# Patient Record
(demographics unavailable — no encounter records)

---

## 2025-04-01 NOTE — REASON FOR VISIT
[Symptom and Test Evaluation] : symptom and test evaluation [Hypertension] : hypertension [Other: ____] : [unfilled] [Spouse] : spouse

## 2025-04-01 NOTE — REVIEW OF SYSTEMS
[Weight Gain (___ Lbs)] : [unfilled] ~Ulb weight gain [SOB] : shortness of breath [Dyspnea on exertion] : dyspnea during exertion [Lower Ext Edema] : lower extremity edema [Anxiety] : anxiety [Negative] : Heme/Lymph

## 2025-04-01 NOTE — PHYSICAL EXAM
[Well Developed] : well developed [Well Nourished] : well nourished [No Acute Distress] : no acute distress [Obese] : obese [Normal Conjunctiva] : normal conjunctiva [Normal Venous Pressure] : normal venous pressure [No Carotid Bruit] : no carotid bruit [Normal Rate] : normal [Rhythm Regular] : regular [Normal S1] : normal S1 [Normal S2] : normal S2 [No Respiratory Distress] : no respiratory distress  [Jaswinder ___] : jaswinder LozoyaV [Dullness ____] : dullness [unfilled] [Soft] : abdomen soft [Non Tender] : non-tender [No Masses/organomegaly] : no masses/organomegaly [Normal Bowel Sounds] : normal bowel sounds [Normal Gait] : normal gait [No Cyanosis] : no cyanosis [No Clubbing] : no clubbing [No Varicosities] : no varicosities [Edema ___] : edema [unfilled] [No Rash] : no rash [Moves all extremities] : moves all extremities [No Focal Deficits] : no focal deficits [Normal Speech] : normal speech [Alert and Oriented] : alert and oriented [Normal memory] : normal memory

## 2025-04-02 NOTE — DISCUSSION/SUMMARY
[EKG obtained to assist in diagnosis and management of assessed problem(s)] : EKG obtained to assist in diagnosis and management of assessed problem(s) [FreeTextEntry1] : 56 year old Man with a history of morbid obesity (BMI 39kg/m2), prior diagnosis of diabetes (not on meds for multiple years) presents to the office for cardiovascular evaluation and hospital follow-up.    He is in no acute distress. He is clinically fluid overloaded with LE edema, scrotal edema and mid-section anasarca.  ECG illustrates sinus tachycardia with low voltage QRS.  Blood pressure is elevated at 163/97 with a manual repeat of 146/88.    Will start Carvedilol 6.25mg PO BID for heart rate and blood pressure control.  Will order Furosemide 40mg daily for a net negative daily fluid load and alleviate edema in the lower portion of his body.    He will undergo a 2D echo to assess for any new structural heart disease, changes in valvular and ventricular function to attribute his fluid accumulations.    He will also need to follow up with a PCP, Endocrinologist for better management of his diabetes and GI/Liver Specialist for his liver congestion/cirrhosis.  Diet and exercise counseling was performed in order to reduce future cardiovascular risk.  He will follow up in one month or sooner as needed.

## 2025-04-02 NOTE — HISTORY OF PRESENT ILLNESS
[FreeTextEntry1] : This is a 56 year old Man with a history of morbid obesity (BMI 39kg/m2), prior diagnosis of diabetes (not on meds for multiple years) presents to the office for cardiovascular evaluation and hospital follow-up.    In December 2024 he started to notice some swelling in his feet and noticed that he had trouble walking uphill but did not think much of it. Swelling would go down with feet elevation.  Has not seen a doctor in many years and has not seen anyone for current symptoms of swelling in his LE and mid-section.  In the past 3 to 4 weeks swelling has been progressing up his legs and into his groin and his abdomen. He went to urgent care on Friday 3/29/24 and was referred to the emergency department for further evaluation.   He presents to [facility] ED accompanied by his wife due to diffuse swelling.  Blood work and imaging was performed (CXR, Chest CT w/IV contrast and abd CT).  LLL atelectasis with bilateral pleural effusions Lt > Rt.  Enlarged heart, no pulmonary defect seen, fibrosis/cirrhosis of liver.  Cholelithiasis of the gallbladder and diffuse edema in all soft tissue and subcutaneous tissues.  Notable anasarca. He was later discharged home and told to follow up with a cardiologist.   I personally reviewed all the hospital records available to me at this time, which included but are not limited to the discharge summary, labs and imaging reports.  He presents to the office today with his wife Kimi.  He complains of feeling full, swollen feet, legs, scrotal edema and mid-section. He also complains of SOB and MERA with inclines.  He denies any chest pain, chest pressure, PND, orthopnea, near syncope, syncope, or stroke-like symptoms. He follows a keto and low sugar diet.  He does not exercise but stays active working two jobs. He does not take any medications.

## 2025-04-02 NOTE — HISTORY OF PRESENT ILLNESS
[FreeTextEntry1] : This is a 56 year old Man with a history of morbid obesity (BMI 39kg/m2), prior diagnosis of diabetes (not on meds for multiple years) presents to the office for cardiovascular evaluation and hospital follow-up.    In December 2024 he started to notice some swelling in his feet and noticed that he had trouble walking uphill but did not think much of it. Swelling would go down with feet elevation.  Has not seen a doctor in many years and has not seen anyone for current symptoms of swelling in his LE and mid-section.  In the past 3 to 4 weeks swelling has been progressing up his legs and into his groin and his abdomen. He went to urgent care on Friday 3/29/24 and was referred to the emergency department for further evaluation.   He presents to [facility] ED accompanied by his wife due to diffuse swelling.  Blood work and imaging was performed (CXR, Chest CT w/IV contrast and abd CT).  LLL atelectasis with bilateral pleural effusions Lt > Rt.  Enlarged heart, no pulmonary defect seen, fibrosis/cirrhosis of liver.  Cholelithiasis of the gallbladder and diffuse edema in all soft tissue and subcutaneous tissues.  Notable anasarca. He was later discharged home and told to follow up with a cardiologist.   I personally reviewed all the hospital records available to me at this time, which included but are not limited to the discharge summary, labs and imaging reports.  He presents to the office today with his wife Kimi.  He complains of feeling full, swollen feet, legs, scrotal edema and mid-section. He also complains of SOB and MERA with inclines.  He denies any chest pain, chest pressure, PND, orthopnea, near syncope, syncope, or stroke-like symptoms. He follows a keto and low sugar diet.  He does not exercise but stays active working two jobs. He does not take any medications.

## 2025-04-23 NOTE — HISTORY OF PRESENT ILLNESS
[FreeTextEntry1] : This is a 56 year old Man with a history of morbid obesity (BMI 39kg/m2), prior diagnosis of diabetes (not on meds for multiple years) presents to the office for cardiovascular evaluation.    In December 2024 he started to notice some swelling in his feet and noticed that he had trouble walking uphill but did not think much of it. Swelling would go down with feet elevation.  Has not seen a doctor in many years and has not seen anyone for current symptoms of swelling in his LE and mid-section.  In the past 3 to 4 weeks swelling has been progressing up his legs and into his groin and his abdomen. He went to urgent care on Friday 3/29/24 and was referred to the emergency department for further evaluation.   He presented to [facility] ED accompanied by his wife due to diffuse swelling.  Blood work and imaging was performed (CXR, Chest CT w/IV contrast and abd CT).  LLL atelectasis with bilateral pleural effusions Lt > Rt.  Enlarged heart, no pulmonary defect seen, fibrosis/cirrhosis of liver.    Notable anasarca. He was later discharged home and told to follow up with a cardiologist as he did not have insurance and did not want to get stuck with a hospital bill.      I saw him in follow up, and he was clearly volume overloaded.  He was started on carvedilol and Lasix.  He was sent for an echocardiogram, and is noted to have moderate to severe LV dysfunction, RVE, RV dysfunction, and moderate pulmonary hypertension.  He has only lost 2 pounds since his last visit.    He presents to the office today with his wife Kimi.  He complains of feeling full, swollen feet, legs, scrotal edema and mid-section. He also complains of SOB and MERA with inclines.  He denies any chest pain, chest pressure, PND, orthopnea, near syncope, syncope, or stroke-like symptoms.

## 2025-04-23 NOTE — DISCUSSION/SUMMARY
[FreeTextEntry1] : 56 year old Man with a history of morbid obesity (BMI 39kg/m2), prior diagnosis of diabetes (not on meds for multiple years) presents to the office for cardiovascular evaluation and hospital follow-up.    He presented to [facility] ED accompanied by his wife due to diffuse swelling.  Blood work and imaging was performed (CXR, Chest CT w/IV contrast and abd CT).  LLL atelectasis with bilateral pleural effusions Lt > Rt.  Enlarged heart, no pulmonary defect seen, fibrosis/cirrhosis of liver.    Notable anasarca. He was later discharged home and told to follow up with a cardiologist as he did not have insurance and did not want to get stuck with a hospital bill.      I saw him in follow up, and he was clearly volume overloaded.  He was started on carvedilol and Lasix.  He was sent for an echocardiogram, and is noted to have moderate to severe LV dysfunction, RVE, RV dysfunction, and moderate pulmonary hypertension.  He has only lost 2 pounds since his last visit.   He is not responding to diuresis.  I advised them to go to the ED at  now for a heart failure evaluation, right and left heart cath, hepatology evaluation, and possible thoracentesis.  They can not go as they have business to tend to in [place].  They will go Monday AM.  He is going to increase Lasix to 60 QD, and start Entresto 24-26 bid.  He will continue carvedilol 6.25 bid.  They understand the risks of waiting, including death.  [EKG obtained to assist in diagnosis and management of assessed problem(s)] : EKG obtained to assist in diagnosis and management of assessed problem(s)

## 2025-05-13 NOTE — REASON FOR VISIT
[Hypertension] : hypertension [Other: ____] : [unfilled] [Cardiac Failure] : cardiac failure [Hyperlipidemia] : hyperlipidemia [Coronary Artery Disease] : coronary artery disease

## 2025-05-13 NOTE — PHYSICAL EXAM
[Well Developed] : well developed [Well Nourished] : well nourished [No Acute Distress] : no acute distress [Obese] : obese [Normal Conjunctiva] : normal conjunctiva [Normal Venous Pressure] : normal venous pressure [No Carotid Bruit] : no carotid bruit [Normal Rate] : normal [Rhythm Regular] : regular [Normal S1] : normal S1 [Normal S2] : normal S2 [No Respiratory Distress] : no respiratory distress  [Jaswinder ___] : jaswinder LozoyaV [Dullness ____] : dullness [unfilled] [Soft] : abdomen soft [Non Tender] : non-tender [No Masses/organomegaly] : no masses/organomegaly [Normal Bowel Sounds] : normal bowel sounds [Normal Gait] : normal gait [No Cyanosis] : no cyanosis [No Clubbing] : no clubbing [No Varicosities] : no varicosities [Edema ___] : edema [unfilled] [No Rash] : no rash [Moves all extremities] : moves all extremities [No Focal Deficits] : no focal deficits [Normal Speech] : normal speech [Alert and Oriented] : alert and oriented [Normal memory] : normal memory [No Murmur] : no murmurs heard [No Pitting Edema] : no pitting edema present

## 2025-05-13 NOTE — HISTORY OF PRESENT ILLNESS
[FreeTextEntry1] : This is a 56 year old Man with a history of morbid obesity (BMI 39kg/m2), prior diagnosis of diabetes (not on meds for multiple years) presents to the office for cardiovascular evaluation.    In December 2024 he started to notice some swelling in his feet and noticed that he had trouble walking uphill but did not think much of it. Swelling would go down with feet elevation.  Has not seen a doctor in many years and has not seen anyone for current symptoms of swelling in his LE and mid-section.  In the past 3 to 4 weeks swelling has been progressing up his legs and into his groin and his abdomen. He went to urgent care on Friday 3/29/24 and was referred to the emergency department for further evaluation.   He presented to [facility] ED accompanied by his wife due to diffuse swelling.  Blood work and imaging was performed (CXR, Chest CT w/IV contrast and abd CT).  LLL atelectasis with bilateral pleural effusions Lt > Rt.  Enlarged heart, no pulmonary defect seen, fibrosis/cirrhosis of liver.    Notable anasarca. He was later discharged home and told to follow up with a cardiologist as he did not have insurance and did not want to get stuck with a hospital bill.  I saw him in follow up, and he was clearly volume overloaded.  He was started on carvedilol and Lasix.  He was sent for an echocardiogram, and is noted to have moderate to severe LV dysfunction, RVE, RV dysfunction, and moderate pulmonary hypertension.  He has only lost 2 pounds since his last visit.     Arrives now again s/p hospitalization with ADHF,new HFrEF in the setting of ICM admitted on 4/29/25. He initally presented to the ED on 4/28 with overload symptoms as was advised but left prior to being seen due to wait time in the ED.  He then returns the next day to the ED, Hospitalization workup included  LHC on 4/29, LVEDP 25 showing mvCAD, poor target LAD-80%pLAD and LCX 50% as well as a RCA .  4/30/25: LVEF 31%, global LV hypokinesis, Grade III diastolic dysfunction RVE with reduced RV function, mild PH cMRI 5/1 shows viability. Normal left ventricular size. Normal left ventricular wall thickness. Severely reduced left ventricular systolic function (LVEF 31%). Global hypokinesis. Septal flattening in systole and diastole suggestive of right ventricular pressure and volume overload. Subendocardial LGE of the anterior wall, septum, inferolateral wall, and apical cap as detailed above; there is viability in these territories.  Normal right ventricular   Hepatology was consulted based on CTAP showed moderate volume ascites in all 4 quadrants, and heterogeneous perfusion of the liver with irregular surface suggesting fibrosis/cirrhosis, possibly based on chronic congestion and right heart failure. Outpatient follow up advised.  (liver biopsy to determine whether he has cirrhosis or liver congestion). CT surgery consultation obtained, surgery deemed high risk at this time noting severe ischemic cardiomyopathy, poor coronary targets, abdominal ascites and a nodular liver consistent with cirrhosis. Optimized medical therapy and outpatient HF follow up.  He arrives today for follow up , feeling "much better". Reports no longer experiencing SOB on inclines. He denies chest pains or pressure. He  denies dizzy spells, feeling faint or fainting, He   denies palpitations or difficulty breathing while laying flat. He denies lower extremity swelling. All edema has resolved.  He has been diuresing well, overall down 100 pounds of volume weight and is started on GDMTs tolerating well.  Medication reconciliation performed, he is taking all advised medications.

## 2025-05-13 NOTE — CARDIOLOGY SUMMARY
[de-identified] : NSR.  Low voltage.  Nonspecific T wave changes.  [de-identified] : 4/30/25: LVEF 31%, global LV hypokinesis, Grade III diastolic dysfunction RVE with reduced RV function, mild PH 4/18/25: LVEF 34%, global hypokinesis, RVE-reduced RV function, mild MR, mild-mod TR, PASP 54 [de-identified] : 5/1/25: cardiac MR 1. Normal left ventricular size. Normal left ventricular wall thickness. Severely reduced left ventricular systolic function (LVEF 31%). Global hypokinesis. Septal flattening in systole and diastole suggestive of right ventricular pressure and volume overload. 2. Subendocardial LGE of the anterior wall, septum, inferolateral wall, and apical cap as detailed above; there is viability in these territories. 3. Normal right ventricular size by indexed volume (visually appears   [de-identified] : 4/29/25: Diagnostic Findings:  Coronary Angiography  The coronary circulation is right dominant.    LM  Left main artery: Angiography shows minor irregularities.   LAD  Proximal left anterior descending: diffuse distal disease. There is an 80 % stenosis. CX  Circumflex: There is a 50 % stenosis.   RCA  Right coronary artery: There is a 100 % stenosis.     [de-identified] : 4/30/25  no carotid stenosis

## 2025-05-13 NOTE — REVIEW OF SYSTEMS
[Weight Gain (___ Lbs)] : [unfilled] ~Ulb weight gain [Anxiety] : anxiety [Negative] : Heme/Lymph [SOB] : no shortness of breath [Dyspnea on exertion] : not dyspnea during exertion [Chest Discomfort] : no chest discomfort [Lower Ext Edema] : no extremity edema [Leg Claudication] : no intermittent leg claudication [Palpitations] : no palpitations [Orthopnea] : no orthopnea [PND] : no PND [Syncope] : no syncope [FreeTextEntry5] : all prior symptoms resolved

## 2025-05-13 NOTE — DISCUSSION/SUMMARY
[EKG obtained to assist in diagnosis and management of assessed problem(s)] : EKG obtained to assist in diagnosis and management of assessed problem(s) [FreeTextEntry1] : 56 year old Man with a history of morbid obesity (BMI 39kg/m2), prior diagnosis of diabetes (not on meds for multiple years) presents to the office for cardiovascular evaluation and hospital follow-up.    He presented to [facility] ED accompanied by his wife due to diffuse swelling.  Blood work and imaging was performed (CXR, Chest CT w/IV contrast and abd CT).  LLL atelectasis with bilateral pleural effusions Lt > Rt.  Enlarged heart, no pulmonary defect seen, fibrosis/cirrhosis of liver.    Notable anasarca. He was later discharged home and told to follow up with a cardiologist as he did not have insurance and did not want to get stuck with a hospital bill.      I saw him in follow up, and he was clearly volume overloaded.  He was started on carvedilol and Lasix.  He was sent for an echocardiogram, and is noted to have moderate to severe LV dysfunction, RVE, RV dysfunction, and moderate pulmonary hypertension.    Arrives now again s/p hospitalization with ADHF,new HFrEF in the setting of ICM admitted on 4/29/25. He initally presented to the ED on 4/28 with overload symptoms as was advised but left prior to being seen due to wait time in the ED.  He then returns the next day to the ED, Hospitalization workup included  LHC on 4/29, LVEDP 25 showing mvCAD, poor target LAD-80%pLAD and LCX 50% as well as a RCA . Echocardiogram again demonstrating LVEF 31%, global LV hypokinesis, Grade III diastolic dysfunction RVE with reduced RV function, mild PH. cMRI 5/1 shows viability with LVEF 31%. Hepatology was consulted due to findings of CT CAP suggesting fibrosis/cirrhosis, possibly based on chronic congestion and right heart failure.   He arrives today , compensated from a HF and volume perspective. His weight is down 100 lbs. He is euvolemic on exam. ECG illustrates sinus rhythm, low voltage and his B/P normalized by the conclusion of the visit. Mr Horton was diagnosed with severe ICM, HFrEF, NYHA class II. He is now on GDMT and tolerating.  He will continue volume management with bumex 1mg daily, farxiga 10mg  He will continue entresto 97/103 BID, eplerenone 25mg.  He will increase carvedilol to 12.5mg BID to optimize HR and B/P. F/U with heart failure team as scheduled.  Coronary angiogram demonstrates mvCAD,  RCA .He has follow up with CT surgery in 5/23. If Targets deemed poor, will look into candidacy for revascularization via PCI. He will continue atorvastatin 80mg daily for goal LDL <70,ideally closer to 55 and ASA for 2nd prevention.  There were image findings c/w possible cirrhosis, he will follow up with hepatology for further workup as scheduled on 5/29.   A1C was 8.3, He knows the importance of diabetes management. He is scheduling follow up with internal medicine within the next few weeks.  HE will continue farxiga for now.  He will have B/W done. He will follow up in [place], sooner as needed.

## 2025-05-21 NOTE — HISTORY OF PRESENT ILLNESS
[FreeTextEntry1] : Patient is a 57yo man followed and referred by Dr. Collins for evaluation.  PMH includes morbid obesity (BMI 39kg/m2), prior diagnosis of diabetes (not on meds for multiple years).   In December 2024 he started to notice some swelling in his feet and noticed that he had trouble walking uphill but did not think much of it. Swelling would go down with feet elevation. Has not seen a doctor in many years and has not seen anyone for current symptoms of swelling in his LE and mid-section. In the past 3 to 4 weeks swelling has been progressing up his legs and into his groin and his abdomen. He went to urgent care on Friday 3/29/24 and was referred to the emergency department for further evaluation.  He presented to [facility] ED accompanied by his wife due to diffuse swelling. Blood work and imaging was performed (CXR, Chest CT w/IV contrast and abd CT). LLL atelectasis with bilateral pleural effusions Lt > Rt. Enlarged heart, no pulmonary defect seen, fibrosis/cirrhosis of liver. Notable anasarca. He was later discharged home and told to follow up with a cardiologist as he did not have insurance and did not want to get stuck with a hospital bill.   He was readmitted 4/29-3/8 with ADHF, new HFrEF in the setting of ICM. Hospitalization workup included LHC on 4/29, LVEDP 25 showing mvCAD, poor target LAD-80%pLAD and LCX 50% as well as a RCA . Underwent TTE 4/30/25: LVEF 31%, global LV hypokinesis, Grade III diastolic dysfunction RVE with reduced RV function, mild PH. Underwent cMRI 5/1 shows viability. Normal left ventricular size. Normal left ventricular wall thickness. Severely reduced left ventricular systolic function (LVEF 31%). Global hypokinesis. Septal flattening in systole and diastole suggestive of right ventricular pressure and volume overload. Subendocardial LGE of the anterior wall, septum, inferolateral wall, and apical cap as detailed above; there is viability in these territories. Normal right ventricular  Hepatology was consulted based on CTAP showed moderate volume ascites in all 4 quadrants, and heterogeneous perfusion of the liver with irregular surface suggesting fibrosis/cirrhosis, possibly based on chronic congestion and right heart failure. Outpatient follow up advised. (liver biopsy to determine whether he has cirrhosis or liver congestion). CT surgery consultation obtained, surgery deemed high risk at this time noting severe ischemic cardiomyopathy, poor coronary targets, abdominal ascites and a nodular liver consistent with cirrhosis. Optimized medical therapy and outpatient HF follow up.  Saw cardiologist last week, diuresing well, overall down 100 pounds of volume weight and is started on GDMTs tolerating well.  Presents today.  Seeing HF Friday.  Lost 100 lbs of edema.  Feels much better since.  Denies CP, SOB. dizziness. 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

## 2025-05-21 NOTE — END OF VISIT
[FreeTextEntry3] : Written by Aquilino Resendiz NP, acting as a scribe for Dr. Munoz The documentation recorded by the scribe accurately reflects the service I personally performed and the decisions made by me. Signature Ranjeet Munoz MD.

## 2025-05-21 NOTE — HISTORY OF PRESENT ILLNESS
[FreeTextEntry1] : Patient is a 55yo man followed and referred by Dr. Collins for evaluation.  PMH includes morbid obesity (BMI 39kg/m2), prior diagnosis of diabetes (not on meds for multiple years).   In December 2024 he started to notice some swelling in his feet and noticed that he had trouble walking uphill but did not think much of it. Swelling would go down with feet elevation. Has not seen a doctor in many years and has not seen anyone for current symptoms of swelling in his LE and mid-section. In the past 3 to 4 weeks swelling has been progressing up his legs and into his groin and his abdomen. He went to urgent care on Friday 3/29/24 and was referred to the emergency department for further evaluation.  He presented to [facility] ED accompanied by his wife due to diffuse swelling. Blood work and imaging was performed (CXR, Chest CT w/IV contrast and abd CT). LLL atelectasis with bilateral pleural effusions Lt > Rt. Enlarged heart, no pulmonary defect seen, fibrosis/cirrhosis of liver. Notable anasarca. He was later discharged home and told to follow up with a cardiologist as he did not have insurance and did not want to get stuck with a hospital bill.   He was readmitted 4/29-3/8 with ADHF, new HFrEF in the setting of ICM. Hospitalization workup included LHC on 4/29, LVEDP 25 showing mvCAD, poor target LAD-80%pLAD and LCX 50% as well as a RCA . Underwent TTE 4/30/25: LVEF 31%, global LV hypokinesis, Grade III diastolic dysfunction RVE with reduced RV function, mild PH. Underwent cMRI 5/1 shows viability. Normal left ventricular size. Normal left ventricular wall thickness. Severely reduced left ventricular systolic function (LVEF 31%). Global hypokinesis. Septal flattening in systole and diastole suggestive of right ventricular pressure and volume overload. Subendocardial LGE of the anterior wall, septum, inferolateral wall, and apical cap as detailed above; there is viability in these territories. Normal right ventricular  Hepatology was consulted based on CTAP showed moderate volume ascites in all 4 quadrants, and heterogeneous perfusion of the liver with irregular surface suggesting fibrosis/cirrhosis, possibly based on chronic congestion and right heart failure. Outpatient follow up advised. (liver biopsy to determine whether he has cirrhosis or liver congestion). CT surgery consultation obtained, surgery deemed high risk at this time noting severe ischemic cardiomyopathy, poor coronary targets, abdominal ascites and a nodular liver consistent with cirrhosis. Optimized medical therapy and outpatient HF follow up.  Saw cardiologist last week, diuresing well, overall down 100 pounds of volume weight and is started on GDMTs tolerating well.  Presents today.  Seeing HF Friday.  Lost 100 lbs of edema.  Feels much better since.  Denies CP, SOB. dizziness. 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

## 2025-05-21 NOTE — PHYSICAL EXAM
[General Appearance - Alert] : alert [General Appearance - In No Acute Distress] : in no acute distress [Sclera] : the sclera and conjunctiva were normal [Neck Cervical Mass (___cm)] : no neck mass was observed [] : no respiratory distress [Respiration, Rhythm And Depth] : normal respiratory rhythm and effort [Exaggerated Use Of Accessory Muscles For Inspiration] : no accessory muscle use [Auscultation Breath Sounds / Voice Sounds] : lungs were clear to auscultation bilaterally [Apical Impulse] : the apical impulse was normal [Heart Rate And Rhythm] : heart rate was normal and rhythm regular [Heart Sounds] : normal S1 and S2 [Abdomen Soft] : soft [Abdomen Tenderness] : non-tender [Involuntary Movements] : no involuntary movements were seen [No Focal Deficits] : no focal deficits [Oriented To Time, Place, And Person] : oriented to person, place, and time [Impaired Insight] : insight and judgment were intact [Affect] : the affect was normal [Mood] : the mood was normal

## 2025-05-21 NOTE — ASSESSMENT
[FreeTextEntry1] : I reviewed the cardiac imaging, medical records and reports with patient and discussed the case.  Recommend medical MGMT, Will refer for PCI. Follow up with HF for GDMT

## 2025-05-21 NOTE — HISTORY OF PRESENT ILLNESS
[FreeTextEntry1] : Patient is a 57yo man followed and referred by Dr. Collins for evaluation.  PMH includes morbid obesity (BMI 39kg/m2), prior diagnosis of diabetes (not on meds for multiple years).   In December 2024 he started to notice some swelling in his feet and noticed that he had trouble walking uphill but did not think much of it. Swelling would go down with feet elevation. Has not seen a doctor in many years and has not seen anyone for current symptoms of swelling in his LE and mid-section. In the past 3 to 4 weeks swelling has been progressing up his legs and into his groin and his abdomen. He went to urgent care on Friday 3/29/24 and was referred to the emergency department for further evaluation.  He presented to [facility] ED accompanied by his wife due to diffuse swelling. Blood work and imaging was performed (CXR, Chest CT w/IV contrast and abd CT). LLL atelectasis with bilateral pleural effusions Lt > Rt. Enlarged heart, no pulmonary defect seen, fibrosis/cirrhosis of liver. Notable anasarca. He was later discharged home and told to follow up with a cardiologist as he did not have insurance and did not want to get stuck with a hospital bill.   He was readmitted 4/29-3/8 with ADHF, new HFrEF in the setting of ICM. Hospitalization workup included LHC on 4/29, LVEDP 25 showing mvCAD, poor target LAD-80%pLAD and LCX 50% as well as a RCA . Underwent TTE 4/30/25: LVEF 31%, global LV hypokinesis, Grade III diastolic dysfunction RVE with reduced RV function, mild PH. Underwent cMRI 5/1 shows viability. Normal left ventricular size. Normal left ventricular wall thickness. Severely reduced left ventricular systolic function (LVEF 31%). Global hypokinesis. Septal flattening in systole and diastole suggestive of right ventricular pressure and volume overload. Subendocardial LGE of the anterior wall, septum, inferolateral wall, and apical cap as detailed above; there is viability in these territories. Normal right ventricular  Hepatology was consulted based on CTAP showed moderate volume ascites in all 4 quadrants, and heterogeneous perfusion of the liver with irregular surface suggesting fibrosis/cirrhosis, possibly based on chronic congestion and right heart failure. Outpatient follow up advised. (liver biopsy to determine whether he has cirrhosis or liver congestion). CT surgery consultation obtained, surgery deemed high risk at this time noting severe ischemic cardiomyopathy, poor coronary targets, abdominal ascites and a nodular liver consistent with cirrhosis. Optimized medical therapy and outpatient HF follow up.  Saw cardiologist last week, diuresing well, overall down 100 pounds of volume weight and is started on GDMTs tolerating well.  Presents today.  Seeing HF Friday.  Lost 100 lbs of edema.  Feels much better since.  Denies CP, SOB. dizziness. 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

## 2025-05-23 NOTE — PHYSICAL EXAM
[No Edema] : no edema [Normal Radial B/L] : normal radial B/L [Normal] : moves all extremities, no focal deficits, normal speech [Alert and Oriented] : alert and oriented [de-identified] : MMM. No perioral cyanosis. [de-identified] : JVP 6-8 cm H2O [de-identified] : warm peripherally

## 2025-05-23 NOTE — HISTORY OF PRESENT ILLNESS
[FreeTextEntry1] : Mr. Horton is a 56M with newly diagnosed ICM HFrEF (LVEF 31%, LVIDd 5.2 cm, 2025), PMHx T2DM (A1c 8.3%, 2025), & HTN presents today for HFU.  He presented to [place] ED in 3/2025 with BLE edema that progressed into his groin & abdomen & worsening activity tolerance. Per ED note, he had been experiencing these s/s since 2024 but had not been evaluated. Of note, he has not seen any doctor for "many years." CT chest/AP revealing B/L pleural effusions & notable anasarca. He was not amenable to admission at that time for financial concerns d/t lack of insurance, so he was dc home & established care with general cardiology clinic. Some GDMT initiated as an outpatient: Entresto 24-26 mg BID & carvedilol 6.25 mg BID.  He was recently hospitalized from 2025-2025 for anasarca from BLEs to abdomen & new ICM HFrEF. ED labs: delta troponin negative, pro-BNP 3,4237, Na 133, t-bii 2.1, . He underwent LHC which revealed multivessel CAD with poor LAD target with cMRI with subendocardial LGE of anterior wall, septum, inferolateral wall, & apical cap demonstrating viability for revascularization & LVEF 31%. Dr. Munoz (CTS) evaluated with plan for medical optimization as an outpatient prior to high-risk PCI for revascularization. Hepatology c/s for prior CTs revealing ascites & persistently elevated t-bili & ALP, s/p US fibroscan which suggested lower likelihood of cirrhosis/fibrosis, so etiology though to be chronic RHF. He established care with HF as an inpatient & received IV diuresis with improvement in s/s. Standing weight on admission 295 lb & at discharge 225 lb. DC meds: ASA, atorvastatin 80 mg qHS, Bumex 1 mg qD, carvedilol 6.25 mg BID, Farxiga 10 mg qD, eplerenone 25 mg qD, & Entresto  mg BID).  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 Social occasional ETOH. FHx: Father: CAD & edema (?HF) ( in 80s). Paternal grandfather:  of MI at 75.  He presents to HF clinic today for HFU. Since hospital dc, he has been feeling much better. [place] & has been able to walk unlimited distance with no HF s/s. Pre-hospitalization, he struggled with stairs & any inclined walking, but now he is able to go up & down the stairs with no HF s/s. He notes steady decrease in his weight from 215 lb to 210 lb since being dc home to today. He does not check his BP at home because he does not have a cuff. He has a good appetite & sleeps well. He otherwise denies LH/dizziness, CP, SOB, orthopnea, PND, abdominal bloating, nausea, or BLE edema.

## 2025-05-23 NOTE — HISTORY OF PRESENT ILLNESS
[FreeTextEntry1] : Mr. Horton is a 56M with newly diagnosed ICM HFrEF (LVEF 31%, LVIDd 5.2 cm, 2025), PMHx T2DM (A1c 8.3%, 2025), & HTN presents today for HFU.  He presented to [place] ED in 3/2025 with BLE edema that progressed into his groin & abdomen & worsening activity tolerance. Per ED note, he had been experiencing these s/s since 2024 but had not been evaluated. Of note, he has not seen any doctor for "many years." CT chest/AP revealing B/L pleural effusions & notable anasarca. He was not amenable to admission at that time for financial concerns d/t lack of insurance, so he was dc home & established care with general cardiology clinic. Some GDMT initiated as an outpatient: Entresto 24-26 mg BID & carvedilol 6.25 mg BID.  He was recently hospitalized from 2025-2025 for anasarca from BLEs to abdomen & new ICM HFrEF. ED labs: delta troponin negative, pro-BNP 3,4237, Na 133, t-bii 2.1, . He underwent LHC which revealed multivessel CAD with poor LAD target with cMRI with subendocardial LGE of anterior wall, septum, inferolateral wall, & apical cap demonstrating viability for revascularization & LVEF 31%. Dr. Munoz (CTS) evaluated with plan for medical optimization as an outpatient prior to high-risk PCI for revascularization. Hepatology c/s for prior CTs revealing ascites & persistently elevated t-bili & ALP, s/p US fibroscan which suggested lower likelihood of cirrhosis/fibrosis, so etiology though to be chronic RHF. He established care with HF as an inpatient & received IV diuresis with improvement in s/s. Standing weight on admission 295 lb & at discharge 225 lb. DC meds: ASA, atorvastatin 80 mg qHS, Bumex 1 mg qD, carvedilol 6.25 mg BID, Farxiga 10 mg qD, eplerenone 25 mg qD, & Entresto  mg BID).  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 Social occasional ETOH. FHx: Father: CAD & edema (?HF) ( in 80s). Paternal grandfather:  of MI at 75.  He presents to HF clinic today for HFU. Since hospital dc, he has been feeling much better. [place] & has been able to walk unlimited distance with no HF s/s. Pre-hospitalization, he struggled with stairs & any inclined walking, but now he is able to go up & down the stairs with no HF s/s. He notes steady decrease in his weight from 215 lb to 210 lb since being dc home to today. He does not check his BP at home because he does not have a cuff. He has a good appetite & sleeps well. He otherwise denies LH/dizziness, CP, SOB, orthopnea, PND, abdominal bloating, nausea, or BLE edema.

## 2025-05-23 NOTE — ASSESSMENT
[FreeTextEntry1] : -  Mr. Horton is a 56M with newly diagnosed ICM HFrEF (LVEF 31%, LVIDd 5.2 cm, 4/2025), PMHx T2DM (A1c 8.3%, 4/2025), HTN, & morbid obesity presents today for HFU.  He is ACC/AHA Stage C, NYHA Class I. He is mildly hypertensive given degree of LVSD, euvolemic, & well compensated on exam. He is on nearly optimal GDMT regimen with some room to increase.  #HFrEF (LVEF 31%, LVIDd 5.2 cm, 4/2025) - Etiology: ICM. Multivessel disease, NOT revascularized. - GDMT:    - ARNI: c/w Entresto  mg BID.    - BB: currently on carvedilol 12.5 mg BID. Plan to increase to 18.75 mg BID for better afterload reduction.    - SGLT2i: c/w Farxiga 10 mg qD.    - MRA: c/w Eplerenone 25 mg qD. - Diuretics: c/w Bumex 1 mg qD for maintenance euvolemia. - Device: Will need repeat TTE after taking optimal GDMT ~3 months after eventual revascularization. If LVEF remains </= 31%, will need primary prevention device. - Daily home weights/BPs. Advised pt to purchase BP cuff. Limit home sodium intake. - HF booklet provided to pt. Pt knows to call HF clinic if develops s/s, SBP < 90, or acute weight-gain (>1-2 lb/day or > 5 lb/week). - Labs CMP 5/16/2025 K 5.2, Cl 95, BUN 32, Cr 1.12, t-bili 1.6, AST 42, ALT 42,  / pro-BNP 1,290. Plan to repeat today  #CAD - C/w ASA & high-intensity statin per general cardiology clinic - Plan for high-risk PCI when medically optimized given not surgical candidate for multivessel CAD - F/u Dr. Munoz ([facility])  #Transaminitis - Labs as above, benign abdominal exam - Potentially delayed improvement from prolonged congestion - Repeat CMP & pro-BNP today  #T2DM - Poorly controlled: A1C 8.3% (4/2025) - SGLT2i as above - Refer to endocrine  RTC for NP visit in 2-3 weeks to assess tolerance to increase in carvedilol & to see Dr. Silva at next available.

## 2025-05-23 NOTE — REVIEW OF SYSTEMS
[FreeTextEntry1] :  A 14-point ROS was performed & is non-contributory unless otherwise stated in HPI.

## 2025-05-23 NOTE — PHYSICAL EXAM
[No Edema] : no edema [Normal Radial B/L] : normal radial B/L [Normal] : moves all extremities, no focal deficits, normal speech [Alert and Oriented] : alert and oriented [de-identified] : MMM. No perioral cyanosis. [de-identified] : JVP 6-8 cm H2O [de-identified] : warm peripherally

## 2025-05-23 NOTE — CARDIOLOGY SUMMARY
[de-identified] : - 5/13/2025: SR 70s. [de-identified] : - 4/30/25 TTE: LVIDd 5.2cm, LVEF 31%, severe grade LVDD, no LV thrombus enlarged RV size, reduced RV function, nml bi-atria, mild MR, mild TR, IVC nml   - 4/18/2025 TTE: LVEF 34%, LVIDd 6.2, LVDD. RVE with reduced fxn (TAPSE 1.8). Mild MR, mild-mod TR. RAP 8, ePASP 54. [de-identified] : - 5/1/25 cMRI: LVEF 31% (global). Septal flattening in systole and diastole suggestive of RV overload. Subendocardial LGE of the anterior wall, septum, inferolateral wall, & apical cap as detailed above; there is viability in these territories. RV dilated with nml function. RV insertion point LGE. - 3/28/2025: CT chest/AP: Moderate volume ascites in all 4 quadrants. Heterogeneous perfusion of the liver with irregular surface suggesting fibrosis/cirrhosis, possibly based on chronic congestion & RHF.  [de-identified] : - 5/6/2025 RHC: RA 13, PA 61/23/36, PCWP 17. PA sat 68%, Ao sat 100%. CO/CI 4.81/2.02. - 4/29/2025 LHC: 80% pLAD (poor target), 50% LCx, 100% RCA. LVEDP 25.

## 2025-05-23 NOTE — ASSESSMENT
[FreeTextEntry1] : -  Mr. Horton is a 56M with newly diagnosed ICM HFrEF (LVEF 31%, LVIDd 5.2 cm, 4/2025), PMHx T2DM (A1c 8.3%, 4/2025), HTN, & morbid obesity presents today for HFU.  He is ACC/AHA Stage C, NYHA Class I. He is mildly hypertensive given degree of LVSD, euvolemic, & well compensated on exam. He is on nearly optimal GDMT regimen with some room to increase.  #HFrEF (LVEF 31%, LVIDd 5.2 cm, 4/2025) - Etiology: ICM. Multivessel disease, NOT revascularized. - GDMT:    - ARNI: c/w Entresto  mg BID.    - BB: currently on carvedilol 12.5 mg BID. Plan to increase to 18.75 mg BID for better afterload reduction.    - SGLT2i: c/w Farxiga 10 mg qD.    - MRA: c/w Eplerenone 25 mg qD. - Diuretics: c/w Bumex 1 mg qD for maintenance euvolemia. - Device: Will need repeat TTE after taking optimal GDMT ~3 months after eventual revascularization. If LVEF remains </= 31%, will need primary prevention device. - Daily home weights/BPs. Advised pt to purchase BP cuff. Limit home sodium intake. - HF booklet provided to pt. Pt knows to call HF clinic if develops s/s, SBP < 90, or acute weight-gain (>1-2 lb/day or > 5 lb/week). - Labs CMP 5/16/2025 K 5.2, Cl 95, BUN 32, Cr 1.12, t-bili 1.6, AST 42, ALT 42,  / pro-BNP 1,290. Plan to repeat today  #CAD - C/w ASA & high-intensity statin per general cardiology clinic - Plan for high-risk PCI when medically optimized given not surgical candidate for multivessel CAD - F/u Dr. Munoz ([facility])  #Transaminitis - Labs as above, benign abdominal exam - Potentially delayed improvement from prolonged congestion - Repeat CMP & pro-BNP today  #T2DM - Poorly controlled: A1C 8.3% (4/2025) - SGLT2i as above - Refer to endocrine  RTC for NP visit in 2-3 weeks to assess tolerance to increase in carvedilol & to see Dr. Silva at next available.

## 2025-05-23 NOTE — CARDIOLOGY SUMMARY
[de-identified] : - 5/13/2025: SR 70s. [de-identified] : - 4/30/25 TTE: LVIDd 5.2cm, LVEF 31%, severe grade LVDD, no LV thrombus enlarged RV size, reduced RV function, nml bi-atria, mild MR, mild TR, IVC nml   - 4/18/2025 TTE: LVEF 34%, LVIDd 6.2, LVDD. RVE with reduced fxn (TAPSE 1.8). Mild MR, mild-mod TR. RAP 8, ePASP 54. [de-identified] : - 5/1/25 cMRI: LVEF 31% (global). Septal flattening in systole and diastole suggestive of RV overload. Subendocardial LGE of the anterior wall, septum, inferolateral wall, & apical cap as detailed above; there is viability in these territories. RV dilated with nml function. RV insertion point LGE. - 3/28/2025: CT chest/AP: Moderate volume ascites in all 4 quadrants. Heterogeneous perfusion of the liver with irregular surface suggesting fibrosis/cirrhosis, possibly based on chronic congestion & RHF.  [de-identified] : - 5/6/2025 RHC: RA 13, PA 61/23/36, PCWP 17. PA sat 68%, Ao sat 100%. CO/CI 4.81/2.02. - 4/29/2025 LHC: 80% pLAD (poor target), 50% LCx, 100% RCA. LVEDP 25.

## 2025-05-23 NOTE — PHYSICAL EXAM
[No Edema] : no edema [Normal Radial B/L] : normal radial B/L [Normal] : moves all extremities, no focal deficits, normal speech [Alert and Oriented] : alert and oriented [de-identified] : MMM. No perioral cyanosis. [de-identified] : JVP 6-8 cm H2O [de-identified] : warm peripherally

## 2025-05-23 NOTE — CARDIOLOGY SUMMARY
[de-identified] : - 5/13/2025: SR 70s. [de-identified] : - 4/30/25 TTE: LVIDd 5.2cm, LVEF 31%, severe grade LVDD, no LV thrombus enlarged RV size, reduced RV function, nml bi-atria, mild MR, mild TR, IVC nml   - 4/18/2025 TTE: LVEF 34%, LVIDd 6.2, LVDD. RVE with reduced fxn (TAPSE 1.8). Mild MR, mild-mod TR. RAP 8, ePASP 54. [de-identified] : - 5/1/25 cMRI: LVEF 31% (global). Septal flattening in systole and diastole suggestive of RV overload. Subendocardial LGE of the anterior wall, septum, inferolateral wall, & apical cap as detailed above; there is viability in these territories. RV dilated with nml function. RV insertion point LGE. - 3/28/2025: CT chest/AP: Moderate volume ascites in all 4 quadrants. Heterogeneous perfusion of the liver with irregular surface suggesting fibrosis/cirrhosis, possibly based on chronic congestion & RHF.  [de-identified] : - 5/6/2025 RHC: RA 13, PA 61/23/36, PCWP 17. PA sat 68%, Ao sat 100%. CO/CI 4.81/2.02. - 4/29/2025 LHC: 80% pLAD (poor target), 50% LCx, 100% RCA. LVEDP 25.

## 2025-05-23 NOTE — HISTORY OF PRESENT ILLNESS
[FreeTextEntry1] : Mr. Horton is a 56M with newly diagnosed ICM HFrEF (LVEF 31%, LVIDd 5.2 cm, 2025), PMHx T2DM (A1c 8.3%, 2025), & HTN presents today for HFU.  He presented to [place] ED in 3/2025 with BLE edema that progressed into his groin & abdomen & worsening activity tolerance. Per ED note, he had been experiencing these s/s since 2024 but had not been evaluated. Of note, he has not seen any doctor for "many years." CT chest/AP revealing B/L pleural effusions & notable anasarca. He was not amenable to admission at that time for financial concerns d/t lack of insurance, so he was dc home & established care with general cardiology clinic. Some GDMT initiated as an outpatient: Entresto 24-26 mg BID & carvedilol 6.25 mg BID.  He was recently hospitalized from 2025-2025 for anasarca from BLEs to abdomen & new ICM HFrEF. ED labs: delta troponin negative, pro-BNP 3,4237, Na 133, t-bii 2.1, . He underwent LHC which revealed multivessel CAD with poor LAD target with cMRI with subendocardial LGE of anterior wall, septum, inferolateral wall, & apical cap demonstrating viability for revascularization & LVEF 31%. Dr. Munoz (CTS) evaluated with plan for medical optimization as an outpatient prior to high-risk PCI for revascularization. Hepatology c/s for prior CTs revealing ascites & persistently elevated t-bili & ALP, s/p US fibroscan which suggested lower likelihood of cirrhosis/fibrosis, so etiology though to be chronic RHF. He established care with HF as an inpatient & received IV diuresis with improvement in s/s. Standing weight on admission 295 lb & at discharge 225 lb. DC meds: ASA, atorvastatin 80 mg qHS, Bumex 1 mg qD, carvedilol 6.25 mg BID, Farxiga 10 mg qD, eplerenone 25 mg qD, & Entresto  mg BID).  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 Social occasional ETOH. FHx: Father: CAD & edema (?HF) ( in 80s). Paternal grandfather:  of MI at 75.  He presents to HF clinic today for HFU. Since hospital dc, he has been feeling much better. [place] & has been able to walk unlimited distance with no HF s/s. Pre-hospitalization, he struggled with stairs & any inclined walking, but now he is able to go up & down the stairs with no HF s/s. He notes steady decrease in his weight from 215 lb to 210 lb since being dc home to today. He does not check his BP at home because he does not have a cuff. He has a good appetite & sleeps well. He otherwise denies LH/dizziness, CP, SOB, orthopnea, PND, abdominal bloating, nausea, or BLE edema.

## 2025-05-23 NOTE — ASSESSMENT
[FreeTextEntry1] : -  Mr. Horton is a 56M with newly diagnosed ICM HFrEF (LVEF 31%, LVIDd 5.2 cm, 4/2025), PMHx T2DM (A1c 8.3%, 4/2025), HTN, & morbid obesity presents today for HFU.  He is ACC/AHA Stage C, NYHA Class I. He is mildly hypertensive given degree of LVSD, euvolemic, & well compensated on exam. He is on nearly optimal GDMT regimen with some room to increase.  #HFrEF (LVEF 31%, LVIDd 5.2 cm, 4/2025) - Etiology: ICM. Multivessel disease, NOT revascularized. - GDMT:    - ARNI: c/w Entresto  mg BID.    - BB: currently on carvedilol 12.5 mg BID. Plan to increase to 18.75 mg BID for better afterload reduction.    - SGLT2i: c/w Farxiga 10 mg qD.    - MRA: c/w Eplerenone 25 mg qD. - Diuretics: c/w Bumex 1 mg qD for maintenance euvolemia. - Device: Will need repeat TTE after taking optimal GDMT ~3 months after eventual revascularization. If LVEF remains </= 31%, will need primary prevention device. - Daily home weights/BPs. Advised pt to purchase BP cuff. Limit home sodium intake. - HF booklet provided to pt. Pt knows to call HF clinic if develops s/s, SBP < 90, or acute weight-gain (>1-2 lb/day or > 5 lb/week). - Labs CMP 5/16/2025 K 5.2, Cl 95, BUN 32, Cr 1.12, t-bili 1.6, AST 42, ALT 42,  / pro-BNP 1,290. Plan to repeat today  #CAD - C/w ASA & high-intensity statin per general cardiology clinic - Plan for high-risk PCI when medically optimized given not surgical candidate for multivessel CAD - F/u Dr. Munoz ([facility])  #Transaminitis - Labs as above, benign abdominal exam - Potentially delayed improvement from prolonged congestion - Repeat CMP & pro-BNP today  #T2DM - Poorly controlled: A1C 8.3% (4/2025) - SGLT2i as above - Refer to endocrine  RTC for NP visit in 2-3 weeks to assess tolerance to increase in carvedilol & to see Dr. Silva at next available.

## 2025-06-13 NOTE — CARDIOLOGY SUMMARY
[de-identified] : - 5/13/2025: SR 70s. [de-identified] : - 4/30/25 TTE: LVIDd 5.2cm, LVEF 31%, severe grade LVDD, no LV thrombus enlarged RV size, reduced RV function, nml bi-atria, mild MR, mild TR, IVC nml   - 4/18/2025 TTE: LVEF 34%, LVIDd 6.2, LVDD. RVE with reduced fxn (TAPSE 1.8). Mild MR, mild-mod TR. RAP 8, ePASP 54. [de-identified] : - 5/1/25 cMRI: LVEF 31% (global). Septal flattening in systole and diastole suggestive of RV overload. Subendocardial LGE of the anterior wall, septum, inferolateral wall, & apical cap as detailed above; there is viability in these territories. RV dilated with nml function. RV insertion point LGE. - 3/28/2025: CT chest/AP: Moderate volume ascites in all 4 quadrants. Heterogeneous perfusion of the liver with irregular surface suggesting fibrosis/cirrhosis, possibly based on chronic congestion & RHF.  [de-identified] : - 5/6/2025 RHC: RA 13, PA 61/23/36, PCWP 17. PA sat 68%, Ao sat 100%. CO/CI 4.81/2.02. - 4/29/2025 LHC: 80% pLAD (poor target), 50% LCx, 100% RCA. LVEDP 25.

## 2025-06-13 NOTE — HISTORY OF PRESENT ILLNESS
[FreeTextEntry1] : Mr. Horton is a 56M with newly diagnosed ICM HFrEF (LVEF 31%, LVIDd 5.2 cm, 2025), PMHx T2DM (A1c 8.3%, 2025), & HTN presents today for HFU.  He presented to [place] ED in 3/2025 with BLE edema that progressed into his groin & abdomen & worsening activity tolerance. Per ED note, he had been experiencing these s/s since 2024 but had not been evaluated. Of note, he has not seen any doctor for "many years." CT chest/AP revealing B/L pleural effusions & notable anasarca. He was not amenable to admission at that time for financial concerns d/t lack of insurance, so he was dc home & established care with general cardiology clinic. Some GDMT initiated as an outpatient: Entresto 24-26 mg BID & carvedilol 6.25 mg BID.  He was recently hospitalized from 2025-2025 for anasarca from BLEs to abdomen & new ICM HFrEF. ED labs: delta troponin negative, pro-BNP 3,4237, Na 133, t-bii 2.1, . He underwent LHC which revealed multivessel CAD with poor LAD target with cMRI with subendocardial LGE of anterior wall, septum, inferolateral wall, & apical cap demonstrating viability for revascularization & LVEF 31%. Dr. Munoz (CTS) evaluated with plan for medical optimization as an outpatient prior to high-risk PCI for revascularization. Hepatology c/s for prior CTs revealing ascites & persistently elevated t-bili & ALP, s/p US fibroscan which suggested lower likelihood of cirrhosis/fibrosis, so etiology though to be chronic RHF. He established care with HF as an inpatient & received IV diuresis with improvement in s/s. Standing weight on admission 295 lb & at discharge 225 lb. DC meds: ASA, atorvastatin 80 mg qHS, Bumex 1 mg qD, carvedilol 6.25 mg BID, Farxiga 10 mg qD, eplerenone 25 mg qD, & Entresto  mg BID).  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 Social occasional ETOH. FHx: Father: CAD & edema (?HF) ( in 80s). Paternal grandfather:  of MI at 75.  He presents to clinic today and states since last seen he has felt well. He notes an unchanged AT and is keeping active with walk [place]mile [place] without limitations. His home BP readings have been stable ranging ~120-130's and weight has been stable ranging, currently taking Coreg 18.75mg BID and feel well. He adheres to low sodium diet and no issues with mediations.   Otherwise, denies CP, SOB at rest, orthopnea, PND, LH/dizziness, abdominal discomfort, LE edema, palpitations, and syncope.

## 2025-06-13 NOTE — ASSESSMENT
[FreeTextEntry1] : Mr. Horton is a 56M with newly diagnosed ICM HFrEF (LVEF 31%, LVIDd 5.2 cm, 4/2025), PMHx T2DM (A1c 8.3%, 4/2025), HTN, & morbid obesity presents today for HFU.  He is ACC/AHA Stage C, NYHA Class I. He is mildly hypertensive given degree of LVSD, euvolemic, & well compensated on exam. He is on nearly optimal GDMT regimen with some room to increase.  #HFrEF (LVEF 31%, LVIDd 5.2 cm, 4/2025) - Etiology: ICM. Multivessel disease, NOT revascularized. - GDMT:    - ARNI: c/w Entresto  mg BID.    - BB: Increase Coreg to 25mg BID starting today from 18.75mg.     - SGLT2i: c/w Farxiga 10 mg qD.    - MRA: c/w Eplerenone 25 mg qD. - Diuretics: c/w Bumex 1 mg qD for maintenance euvolemia. - Device: Will need repeat TTE after taking optimal GDMT ~3 months after eventual revascularization. If LVEF remains </= 31%, will need primary prevention device. - Daily home weights/BPs. Advised pt to purchase BP cuff. Limit home sodium intake. - HF booklet provided to pt. Pt knows to call HF clinic if develops s/s, SBP < 90, or acute weight-gain (>1-2 lb/day or > 5 lb/week). - Labs  #CAD - C/w ASA & high-intensity statin per general cardiology clinic - Plan for high-risk PCI when medically optimized given not surgical candidate for multivessel CAD - F/u Dr. Munoz ([facility])  #Transaminitis - Labs as above, benign abdominal exam - Potentially delayed improvement from prolonged congestion - trend   #T2DM - Poorly controlled: A1C 8.3% (4/2025) - SGLT2i as above - Refer to endocrine  RTC with Dr. Silva in July as scheduled

## 2025-06-13 NOTE — PHYSICAL EXAM
[No Edema] : no edema [Normal Radial B/L] : normal radial B/L [Normal] : moves all extremities, no focal deficits, normal speech [Alert and Oriented] : alert and oriented [de-identified] : MMM. No perioral cyanosis. [de-identified] : JVP 6-8 cm H2O [de-identified] : warm peripherally

## 2025-07-08 NOTE — CARDIOLOGY SUMMARY
[de-identified] : NSR.  Low voltage.  Nonspecific T wave changes.  [de-identified] : 4/30/25: LVEF 31%, global LV hypokinesis, Grade III diastolic dysfunction RVE with reduced RV function, mild PH 4/18/25: LVEF 34%, global hypokinesis, RVE-reduced RV function, mild MR, mild-mod TR, PASP 54 [de-identified] : 5/1/25: cardiac MR 1. Normal left ventricular size. Normal left ventricular wall thickness. Severely reduced left ventricular systolic function (LVEF 31%). Global hypokinesis. Septal flattening in systole and diastole suggestive of right ventricular pressure and volume overload. 2. Subendocardial LGE of the anterior wall, septum, inferolateral wall, and apical cap as detailed above; there is viability in these territories. 3. Normal right ventricular size by indexed volume (visually appears   [de-identified] : 6/26/25: mod diffuse LAD dz, severe dLCX dz, successful PCI to dLCX w/PAULETTE, staged PCI to LAD planned. 4/29/25: Diagnostic Findings:  Coronary Angiography  The coronary circulation is right dominant.    LM  Left main artery: Angiography shows minor irregularities.   LAD  Proximal left anterior descending: diffuse distal disease. There is an 80 % stenosis. CX  Circumflex: There is a 50 % stenosis.   RCA  Right coronary artery: There is a 100 % stenosis.     [de-identified] : 4/30/25  no carotid stenosis

## 2025-07-08 NOTE — HISTORY OF PRESENT ILLNESS
[FreeTextEntry1] : This is a 56 year old Man who presents to the office for a cardiac evaluation.  He has a history of multivessel CAD, not yet revascularized, severe LV dysfunction, hypertension, hyperlipidemia, diabetes, and liver cirrhosis.     In December 2024 he started to notice some swelling in his feet and noticed that he had trouble walking uphill but did not think much of it. Swelling would go down with feet elevation.  Has not seen a doctor in many years and has not seen anyone for current symptoms of swelling in his LE and mid-section.  In the past 3 to 4 weeks swelling has been progressing up his legs and into his groin and his abdomen. He went to urgent care on Friday 3/29/24 and was referred to the emergency department for further evaluation.   He presented to [facility] ED accompanied by his wife due to diffuse swelling.  Blood work and imaging was performed (CXR, Chest CT w/IV contrast and abd CT).  LLL atelectasis with bilateral pleural effusions Lt > Rt.  Enlarged heart, no pulmonary defect seen, fibrosis/cirrhosis of liver.    Notable anasarca. He was later discharged home and told to follow up with a cardiologist as he did not have insurance and did not want to get stuck with a hospital bill.  I saw him in follow up, and he was clearly volume overloaded.  He was started on carvedilol and Lasix.  He was sent for an echocardiogram, and is noted to have moderate to severe LV dysfunction, RVE, RV dysfunction, and moderate pulmonary hypertension.      He was sent to the hospital in April of 2025. He initally presented to the ED on 4/28 with overload symptoms as was advised but left prior to being seen due to wait time in the ED.  He then returned the next day to the ED, Hospitalization workup included  LHC on 4/29, LVEDP 25 showing mvCAD, poor target LAD-80%pLAD and LCX 50% as well as a RCA .  4/30/25: LVEF 31%, global LV hypokinesis, Grade III diastolic dysfunction RVE with reduced RV function, mild PH cMRI 5/1 shows viability. Normal left ventricular size. Normal left ventricular wall thickness. Severely reduced left ventricular systolic function (LVEF 31%). Global hypokinesis. Septal flattening in systole and diastole suggestive of right ventricular pressure and volume overload. Subendocardial LGE of the anterior wall, septum, inferolateral wall, and apical cap as detailed above; there is viability in these territories.     Hepatology was consulted based on CTAP showed moderate volume ascites in all 4 quadrants, and heterogeneous perfusion of the liver with irregular surface suggesting fibrosis/cirrhosis, possibly based on chronic congestion and right heart failure. Outpatient follow up advised.  (liver biopsy to determine whether he has cirrhosis or liver congestion). CT surgery consultation obtained, surgery deemed high risk at this time noting severe ischemic cardiomyopathy, poor coronary targets, abdominal ascites and a nodular liver consistent with cirrhosis. Optimized medical therapy and outpatient HF follow up.  He has followed with heart failure, and is on GDMT.  He is taking his medications.  He is maintaining euvolemia.   He has been feeling "much better". Reports no longer experiencing SOB on inclines. He denies chest pains or pressure. All edema has resolved.    He has been diuresing well, overall down 100 pounds of volume weight and is started on GDMTs tolerating well.  He now arrives for posthospitalization follow-up.  He is status post successful revascularization on June 26, 2025, PCI/PAULETTE to his dLCX and now scheduled for staged PCI/PAULETTE to his LAD on July 17, 2025. He is now on clopidogrel 75 mg daily. He denies dizzy spells, feeling faint or fainting, He   denies palpitations or difficulty breathing while laying flat. He denies lower extremity swelling.  Medication reconciliation performed, he is taking all advised medications.

## 2025-07-08 NOTE — PHYSICAL EXAM
[Well Developed] : well developed [Well Nourished] : well nourished [No Acute Distress] : no acute distress [Obese] : obese [Normal Conjunctiva] : normal conjunctiva [Normal Venous Pressure] : normal venous pressure [No Carotid Bruit] : no carotid bruit [Normal Rate] : normal [Rhythm Regular] : regular [Normal S1] : normal S1 [Normal S2] : normal S2 [No Murmur] : no murmurs heard [No Pitting Edema] : no pitting edema present [No Respiratory Distress] : no respiratory distress  [Jaswinder ___] : jaswinder LozoyaV [Dullness ____] : dullness [unfilled] [Soft] : abdomen soft [Non Tender] : non-tender [No Masses/organomegaly] : no masses/organomegaly [Normal Bowel Sounds] : normal bowel sounds [Normal Gait] : normal gait [No Cyanosis] : no cyanosis [No Clubbing] : no clubbing [No Varicosities] : no varicosities [Edema ___] : edema [unfilled] [No Rash] : no rash [Moves all extremities] : moves all extremities [No Focal Deficits] : no focal deficits [Normal Speech] : normal speech [Alert and Oriented] : alert and oriented [Normal memory] : normal memory

## 2025-07-08 NOTE — REASON FOR VISIT
[Cardiac Failure] : cardiac failure [Coronary Artery Disease] : coronary artery disease [Hyperlipidemia] : hyperlipidemia [Hypertension] : hypertension [Other: ____] : [unfilled]

## 2025-07-08 NOTE — DISCUSSION/SUMMARY
[EKG obtained to assist in diagnosis and management of assessed problem(s)] : EKG obtained to assist in diagnosis and management of assessed problem(s) [FreeTextEntry1] : This is a 56 year old Man who presents to the office for a cardiac evaluation.  He has a history of multivessel CAD, not yet revascularized, severe LV dysfunction, hypertension, hyperlipidemia, diabetes, and liver cirrhosis.     He arrives today , compensated from a HF and volume perspective. He is maintaining his weight.  He is euvolemic on exam.    Mr Horton was diagnosed with severe ICM-Bi-ventricular failure, LVIDd 6.0,HFrEF-NYHA class II-III. He now arrives status post successful revascularization on June 26, 2025, PCI/PAULETTE to his dLCX and now scheduled for staged PCI/PAULETTE to his LAD on July 17, 2025.  He will continue GDMT as tolerating.   He will continue volume management with bumex 1mg daily, farxiga 10mg daily.   He will continue entresto 97/103 BID, eplerenone 25mg.  Carvedilol 25 bid.  F/U with heart failure team as scheduled.    He will remain on plavix 75mg and ASA for 2nd prevention uninterrupted. He will continue atorvastatin 80mg daily for goal LDL <70,ideally closer to 55.  A decision will not be made about ICD until he is three months post revascularization, about October, will repeat an echocardiogram about that time. There were image findings c/w possible cirrhosis, he will follow up with hepatology for further workup.  A1C was 8.3, He knows the importance of diabetes management.     I will see him back after the PCI. HE knows to hold farxiga 5 days prior to PCI.  He knows to call the office with any issues.